# Patient Record
Sex: MALE | Race: WHITE
[De-identification: names, ages, dates, MRNs, and addresses within clinical notes are randomized per-mention and may not be internally consistent; named-entity substitution may affect disease eponyms.]

---

## 2017-12-15 ENCOUNTER — HOSPITAL ENCOUNTER (EMERGENCY)
Dept: HOSPITAL 53 - M ED | Age: 32
Discharge: HOME | End: 2017-12-15
Payer: COMMERCIAL

## 2017-12-15 VITALS — HEIGHT: 69 IN | BODY MASS INDEX: 34.88 KG/M2 | WEIGHT: 235.5 LBS

## 2017-12-15 VITALS — SYSTOLIC BLOOD PRESSURE: 137 MMHG | DIASTOLIC BLOOD PRESSURE: 78 MMHG
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
## 2017-12-15 DIAGNOSIS — Z88.5: ICD-10-CM
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
## 2017-12-15 DIAGNOSIS — S43.402A: Primary | ICD-10-CM

## 2017-12-15 DIAGNOSIS — F17.210: ICD-10-CM

## 2017-12-15 DIAGNOSIS — S50.02XA: ICD-10-CM
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
## 2017-12-15 NOTE — REP
Left elbow five views :

 

There is no fracture or dislocation.

 

Mineralization and joint spaces are normal.

 

There are no calcifications or foreign bodies.

 

Impression:

 

Negative left elbow .

 

 

Signed by

Jose Ramon Alexander MD 12/15/2017 01:54 P

## 2017-12-15 NOTE — REP
Left shoulder three views :

 

There is no fracture or dislocation.

 

Mineralization and joint spaces are normal.

 

There are no calcifications or foreign bodies.

 

Impression:

 

Negative left shoulder .

 

 

Signed by

Jose Ramon Alexander MD 12/15/2017 01:55 P

## 2018-06-01 ENCOUNTER — HOSPITAL ENCOUNTER (EMERGENCY)
Dept: HOSPITAL 53 - M ED | Age: 33
Discharge: HOME | End: 2018-06-01
Payer: COMMERCIAL

## 2018-06-01 DIAGNOSIS — G43.909: Primary | ICD-10-CM

## 2018-06-01 DIAGNOSIS — K64.8: ICD-10-CM

## 2018-06-01 DIAGNOSIS — Z88.5: ICD-10-CM

## 2018-06-01 RX ADMIN — DIPHENHYDRAMINE HYDROCHLORIDE 1 MG: 50 INJECTION, SOLUTION INTRAMUSCULAR; INTRAVENOUS at 08:57

## 2018-06-01 RX ADMIN — KETOROLAC TROMETHAMINE 1 MG: 30 INJECTION, SOLUTION INTRAMUSCULAR at 09:25

## 2018-06-01 RX ADMIN — SODIUM CHLORIDE 1 MLS/HR: 9 INJECTION, SOLUTION INTRAVENOUS at 08:57

## 2018-06-01 RX ADMIN — METOCLOPRAMIDE 1 MG: 5 INJECTION, SOLUTION INTRAMUSCULAR; INTRAVENOUS at 08:57
